# Patient Record
Sex: FEMALE | Race: OTHER | HISPANIC OR LATINO
[De-identification: names, ages, dates, MRNs, and addresses within clinical notes are randomized per-mention and may not be internally consistent; named-entity substitution may affect disease eponyms.]

---

## 2021-02-24 PROBLEM — Z00.00 ENCOUNTER FOR PREVENTIVE HEALTH EXAMINATION: Status: ACTIVE | Noted: 2021-02-24

## 2021-03-16 ENCOUNTER — APPOINTMENT (OUTPATIENT)
Dept: BARIATRICS | Facility: CLINIC | Age: 41
End: 2021-03-16
Payer: COMMERCIAL

## 2021-03-16 VITALS — BODY MASS INDEX: 40.12 KG/M2 | HEIGHT: 62 IN | WEIGHT: 218 LBS

## 2021-03-16 PROCEDURE — 99204 OFFICE O/P NEW MOD 45 MIN: CPT | Mod: 95

## 2021-03-16 NOTE — ASSESSMENT
[FreeTextEntry1] : 40 year old female presenting for morbid obesity with a BMI of 40, interested in going forward with sleeve gastrectomy. Will have patient work with our dietician, see psychologist, ordered blood work and UGI series.

## 2021-03-16 NOTE — HISTORY OF PRESENT ILLNESS
[de-identified] : Ms. Amaya presents today for a chief complaint of morbid obesity. 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 She complains of musculoskeletal pain due to her weight. Denies any medical history besides gestational hypertension and diabetes, hx of two c sections and is on no medications. She has tried multiple dietary regimens and medical weight loss with medications with no success. Denies GERD. Will have her work with our dietician, order blood work, UGI and start certification process.  [Home] : at home, [unfilled] , at the time of the visit. [Medical Office: ([facility])___] : at the medical office located in  [Verbal consent obtained from patient] : the patient, [unfilled]

## 2021-03-16 NOTE — HISTORY OF PRESENT ILLNESS
[de-identified] : Ms. Amaya presents today for a chief complaint of morbid obesity. 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 She complains of musculoskeletal pain due to her weight. Denies any medical history besides gestational hypertension and diabetes, hx of two c sections and is on no medications. She has tried multiple dietary regimens and medical weight loss with medications with no success. Denies GERD. Will have her work with our dietician, order blood work, UGI and start certification process.  [Home] : at home, [unfilled] , at the time of the visit. [Medical Office: ([facility])___] : at the medical office located in  [Verbal consent obtained from patient] : the patient, [unfilled]

## 2021-04-01 ENCOUNTER — TRANSCRIPTION ENCOUNTER (OUTPATIENT)
Age: 41
End: 2021-04-01

## 2021-04-05 ENCOUNTER — APPOINTMENT (OUTPATIENT)
Dept: BARIATRICS | Facility: CLINIC | Age: 41
End: 2021-04-05

## 2021-04-06 ENCOUNTER — APPOINTMENT (OUTPATIENT)
Dept: BARIATRICS | Facility: CLINIC | Age: 41
End: 2021-04-06
Payer: SELF-PAY

## 2021-04-06 PROCEDURE — 98968 PH1 ASSMT&MGMT NQHP 21-30: CPT

## 2021-08-04 ENCOUNTER — APPOINTMENT (OUTPATIENT)
Dept: BARIATRICS | Facility: CLINIC | Age: 41
End: 2021-08-04
Payer: COMMERCIAL

## 2021-08-04 VITALS — BODY MASS INDEX: 41.41 KG/M2 | HEIGHT: 62 IN | WEIGHT: 225 LBS

## 2021-08-04 PROCEDURE — 99442: CPT

## 2021-08-04 NOTE — ASSESSMENT
[FreeTextEntry1] : 40 year old female with history of morbid obesity presenting for a final visit for a lap sleeve gastrectomy scheduled on 8/16/21. All questions answered and preop instructions given.

## 2021-08-04 NOTE — HISTORY OF PRESENT ILLNESS
[Home] : at home, [unfilled] , at the time of the visit. [Medical Office: ([facility])___] : at the medical office located in  [Verbal consent obtained from patient] : the patient, [unfilled] [de-identified] : Ms. Amaya presents today for a final visit for a lap sleeve gastrectomy scheduled on 8/16/21. UGI and EGD reviewed. Alternatives, risks and benefits discussed. All questions answered and preop instructions given. BSTOP, use and rationale of non-opioid medications for pain management explained, patient verbalized understanding.\par

## 2021-08-10 ENCOUNTER — NON-APPOINTMENT (OUTPATIENT)
Age: 41
End: 2021-08-10

## 2021-08-13 ENCOUNTER — LABORATORY RESULT (OUTPATIENT)
Age: 41
End: 2021-08-13

## 2021-08-13 VITALS
HEIGHT: 61 IN | RESPIRATION RATE: 16 BRPM | WEIGHT: 223.11 LBS | DIASTOLIC BLOOD PRESSURE: 85 MMHG | TEMPERATURE: 98 F | SYSTOLIC BLOOD PRESSURE: 129 MMHG | HEART RATE: 71 BPM | OXYGEN SATURATION: 99 %

## 2021-08-13 NOTE — PRE-OP CHECKLIST - SELECT TESTS ORDERED
CBC/CMP/PT/PTT/INR/Urinalysis/EKG/CXR/Results in MD note CBC/CMP/PT/PTT/INR/Urinalysis/EKG/CXR/Results in MD note/COVID-19

## 2021-08-15 ENCOUNTER — TRANSCRIPTION ENCOUNTER (OUTPATIENT)
Age: 41
End: 2021-08-15

## 2021-08-16 ENCOUNTER — INPATIENT (INPATIENT)
Facility: HOSPITAL | Age: 41
LOS: 2 days | Discharge: HOME | DRG: 621 | End: 2021-08-19
Attending: SURGERY | Admitting: SURGERY
Payer: COMMERCIAL

## 2021-08-16 ENCOUNTER — RESULT REVIEW (OUTPATIENT)
Age: 41
End: 2021-08-16

## 2021-08-16 ENCOUNTER — APPOINTMENT (OUTPATIENT)
Dept: BARIATRICS | Facility: HOSPITAL | Age: 41
End: 2021-08-16

## 2021-08-16 DIAGNOSIS — Z98.891 HISTORY OF UTERINE SCAR FROM PREVIOUS SURGERY: Chronic | ICD-10-CM

## 2021-08-16 DIAGNOSIS — Z98.890 OTHER SPECIFIED POSTPROCEDURAL STATES: Chronic | ICD-10-CM

## 2021-08-16 LAB
HCT VFR BLD CALC: 31.3 % — LOW (ref 34.5–45)
HCT VFR BLD CALC: 32.3 % — LOW (ref 34.5–45)
HGB BLD-MCNC: 9.5 G/DL — LOW (ref 11.5–15.5)
HGB BLD-MCNC: 9.9 G/DL — LOW (ref 11.5–15.5)
MCHC RBC-ENTMCNC: 29.7 PG — SIGNIFICANT CHANGE UP (ref 27–34)
MCHC RBC-ENTMCNC: 29.9 PG — SIGNIFICANT CHANGE UP (ref 27–34)
MCHC RBC-ENTMCNC: 30.4 GM/DL — LOW (ref 32–36)
MCHC RBC-ENTMCNC: 30.7 GM/DL — LOW (ref 32–36)
MCV RBC AUTO: 97 FL — SIGNIFICANT CHANGE UP (ref 80–100)
MCV RBC AUTO: 98.4 FL — SIGNIFICANT CHANGE UP (ref 80–100)
NRBC # BLD: 0 /100 WBCS — SIGNIFICANT CHANGE UP (ref 0–0)
NRBC # BLD: 0 /100 WBCS — SIGNIFICANT CHANGE UP (ref 0–0)
PLATELET # BLD AUTO: 203 K/UL — SIGNIFICANT CHANGE UP (ref 150–400)
PLATELET # BLD AUTO: 220 K/UL — SIGNIFICANT CHANGE UP (ref 150–400)
RBC # BLD: 3.18 M/UL — LOW (ref 3.8–5.2)
RBC # BLD: 3.33 M/UL — LOW (ref 3.8–5.2)
RBC # FLD: 13.3 % — SIGNIFICANT CHANGE UP (ref 10.3–14.5)
RBC # FLD: 13.3 % — SIGNIFICANT CHANGE UP (ref 10.3–14.5)
WBC # BLD: 16.68 K/UL — HIGH (ref 3.8–10.5)
WBC # BLD: 18.43 K/UL — HIGH (ref 3.8–10.5)
WBC # FLD AUTO: 16.68 K/UL — HIGH (ref 3.8–10.5)
WBC # FLD AUTO: 18.43 K/UL — HIGH (ref 3.8–10.5)

## 2021-08-16 PROCEDURE — 43775 LAP SLEEVE GASTRECTOMY: CPT

## 2021-08-16 PROCEDURE — 39540 REPAIR OF DIAPHRAGM HERNIA: CPT

## 2021-08-16 PROCEDURE — 88307 TISSUE EXAM BY PATHOLOGIST: CPT | Mod: 26

## 2021-08-16 RX ORDER — HYDROMORPHONE HYDROCHLORIDE 2 MG/ML
0.25 INJECTION INTRAMUSCULAR; INTRAVENOUS; SUBCUTANEOUS
Refills: 0 | Status: DISCONTINUED | OUTPATIENT
Start: 2021-08-16 | End: 2021-08-16

## 2021-08-16 RX ORDER — ACETAMINOPHEN 500 MG
1000 TABLET ORAL ONCE
Refills: 0 | Status: COMPLETED | OUTPATIENT
Start: 2021-08-17 | End: 2021-08-17

## 2021-08-16 RX ORDER — ACETAMINOPHEN 500 MG
650 TABLET ORAL EVERY 6 HOURS
Refills: 0 | Status: DISCONTINUED | OUTPATIENT
Start: 2021-08-16 | End: 2021-08-19

## 2021-08-16 RX ORDER — KETOROLAC TROMETHAMINE 30 MG/ML
15 SYRINGE (ML) INJECTION EVERY 6 HOURS
Refills: 0 | Status: DISCONTINUED | OUTPATIENT
Start: 2021-08-16 | End: 2021-08-16

## 2021-08-16 RX ORDER — SODIUM CHLORIDE 9 MG/ML
500 INJECTION, SOLUTION INTRAVENOUS ONCE
Refills: 0 | Status: COMPLETED | OUTPATIENT
Start: 2021-08-16 | End: 2021-08-16

## 2021-08-16 RX ORDER — HYDROMORPHONE HYDROCHLORIDE 2 MG/ML
0.25 INJECTION INTRAMUSCULAR; INTRAVENOUS; SUBCUTANEOUS ONCE
Refills: 0 | Status: DISCONTINUED | OUTPATIENT
Start: 2021-08-16 | End: 2021-08-16

## 2021-08-16 RX ORDER — ACETAMINOPHEN 500 MG
1000 TABLET ORAL ONCE
Refills: 0 | Status: COMPLETED | OUTPATIENT
Start: 2021-08-16 | End: 2021-08-16

## 2021-08-16 RX ORDER — SODIUM CHLORIDE 9 MG/ML
1000 INJECTION, SOLUTION INTRAVENOUS
Refills: 0 | Status: DISCONTINUED | OUTPATIENT
Start: 2021-08-16 | End: 2021-08-18

## 2021-08-16 RX ORDER — ENOXAPARIN SODIUM 100 MG/ML
30 INJECTION SUBCUTANEOUS ONCE
Refills: 0 | Status: COMPLETED | OUTPATIENT
Start: 2021-08-16 | End: 2021-08-16

## 2021-08-16 RX ORDER — SODIUM CHLORIDE 9 MG/ML
1000 INJECTION, SOLUTION INTRAVENOUS ONCE
Refills: 0 | Status: COMPLETED | OUTPATIENT
Start: 2021-08-16 | End: 2021-08-16

## 2021-08-16 RX ORDER — ONDANSETRON 8 MG/1
4 TABLET, FILM COATED ORAL EVERY 6 HOURS
Refills: 0 | Status: DISCONTINUED | OUTPATIENT
Start: 2021-08-16 | End: 2021-08-19

## 2021-08-16 RX ORDER — GABAPENTIN 400 MG/1
300 CAPSULE ORAL ONCE
Refills: 0 | Status: COMPLETED | OUTPATIENT
Start: 2021-08-16 | End: 2021-08-16

## 2021-08-16 RX ORDER — SCOPALAMINE 1 MG/3D
1 PATCH, EXTENDED RELEASE TRANSDERMAL ONCE
Refills: 0 | Status: COMPLETED | OUTPATIENT
Start: 2021-08-16 | End: 2021-08-16

## 2021-08-16 RX ORDER — PANTOPRAZOLE SODIUM 20 MG/1
40 TABLET, DELAYED RELEASE ORAL DAILY
Refills: 0 | Status: DISCONTINUED | OUTPATIENT
Start: 2021-08-16 | End: 2021-08-19

## 2021-08-16 RX ADMIN — Medication 1000 MILLIGRAM(S): at 22:10

## 2021-08-16 RX ADMIN — SODIUM CHLORIDE 1000 MILLILITER(S): 9 INJECTION, SOLUTION INTRAVENOUS at 17:30

## 2021-08-16 RX ADMIN — HYDROMORPHONE HYDROCHLORIDE 0.25 MILLIGRAM(S): 2 INJECTION INTRAMUSCULAR; INTRAVENOUS; SUBCUTANEOUS at 14:20

## 2021-08-16 RX ADMIN — SODIUM CHLORIDE 140 MILLILITER(S): 9 INJECTION, SOLUTION INTRAVENOUS at 15:09

## 2021-08-16 RX ADMIN — GABAPENTIN 300 MILLIGRAM(S): 400 CAPSULE ORAL at 08:59

## 2021-08-16 RX ADMIN — SODIUM CHLORIDE 1000 MILLILITER(S): 9 INJECTION, SOLUTION INTRAVENOUS at 15:49

## 2021-08-16 RX ADMIN — SCOPALAMINE 1 PATCH: 1 PATCH, EXTENDED RELEASE TRANSDERMAL at 18:41

## 2021-08-16 RX ADMIN — SODIUM CHLORIDE 1000 MILLILITER(S): 9 INJECTION, SOLUTION INTRAVENOUS at 16:43

## 2021-08-16 RX ADMIN — HYDROMORPHONE HYDROCHLORIDE 0.25 MILLIGRAM(S): 2 INJECTION INTRAMUSCULAR; INTRAVENOUS; SUBCUTANEOUS at 14:33

## 2021-08-16 RX ADMIN — Medication 1000 MILLIGRAM(S): at 08:58

## 2021-08-16 RX ADMIN — Medication 1000 MILLIGRAM(S): at 14:03

## 2021-08-16 RX ADMIN — Medication 400 MILLIGRAM(S): at 21:42

## 2021-08-16 RX ADMIN — ENOXAPARIN SODIUM 30 MILLIGRAM(S): 100 INJECTION SUBCUTANEOUS at 09:01

## 2021-08-16 RX ADMIN — Medication 400 MILLIGRAM(S): at 13:44

## 2021-08-16 RX ADMIN — SCOPALAMINE 1 PATCH: 1 PATCH, EXTENDED RELEASE TRANSDERMAL at 08:59

## 2021-08-16 NOTE — H&P ADULT - HISTORY OF PRESENT ILLNESS
40F PMHx ovarian cystectomy,  x 2, MO (BMI 42.2) resistant to dietary and lifestyle interventions presents for elective laparoscopic sleeve gastrectomy. Preop EGD revealed chronic active gastritis, esophagitis, and small hiatal hernia. Pt has no acute complaints today.

## 2021-08-16 NOTE — H&P ADULT - ASSESSMENT
40F PMHx ovarian cystectomy,  x 2, MO (BMI 42.2) resistant to dietary and lifestyle interventions presents for elective laparoscopic sleeve gastrectomy. Preop EGD revealed chronic active gastritis, esophagitis, and small hiatal hernia. Pt has no acute complaints today.   - Proceed to OR

## 2021-08-16 NOTE — BRIEF OPERATIVE NOTE - OPERATION/FINDINGS
a Bilateral crura dissected. Lesser sac entered. Short gastrics divided with Vessel sealer. 36F Bougie edge using 1 black load near and away from incisura angularis followed by purple loads moving along the greater curvature. Hiatal hernia repaired primarily with quill suture. The abdomen was inspected. Hemostasis achieved. Stomach remnant was removed. Fascia was closed with endoclose suture. Skin closed with 4-0 monocryl.

## 2021-08-16 NOTE — H&P ADULT - NSHPPHYSICALEXAM_GEN_ALL_CORE
T(C): --  HR: --  BP: --  RR: --  SpO2: --    GENERAL: NAD, Resting comfortably in bed, awake, opens eyes spontaneously  HEENT: NCAT, MMM, Normal conjunctiva, PERRL  RESP: Nonlabored breathing, No respiratory distress  CARD: Normal rate, Normal peripheral perfusion  GI: Soft, ND, NT, No guarding, No rebound tenderness  EXTREM: WWP, No edema, No gross deformity of extremities  SKIN: No rashes, no lesions  NEURO: AAOx3, No focal motor or sensory deficits  PSYCH: Affect and characteristics of appearance, verbalizations, and behaviors are appropriate GENERAL: NAD, Resting comfortably in bed, awake, opens eyes spontaneously  HEENT: NCAT, MMM, Normal conjunctiva, PERRL  RESP: Nonlabored breathing, No respiratory distress  CARD: Normal rate, Normal peripheral perfusion  GI: Soft, ND, NT, No guarding, No rebound tenderness  EXTREM: WWP, No edema, No gross deformity of extremities  SKIN: No rashes, no lesions  NEURO: AAOx3, No focal motor or sensory deficits  PSYCH: Affect and characteristics of appearance, verbalizations, and behaviors are appropriate

## 2021-08-16 NOTE — BRIEF OPERATIVE NOTE - NSICDXBRIEFPROCEDURE_GEN_ALL_CORE_FT
PROCEDURES:  Laparoscopic sleeve gastrectomy 16-Aug-2021 12:55:07  Paramjit Colbert  Laparoscopic repair of sliding hiatal hernia 16-Aug-2021 12:55:40  Paramjit Colbert

## 2021-08-16 NOTE — CHART NOTE - NSCHARTNOTEFT_GEN_A_CORE
Team 2 was called by patient's nurse that patient's sbp was in the 80s.  POD 0, accessed patient bedside and she was resting comfortably however diaphoretic.  Patient states she had some abdominal discomfort in the epigastric region and she feels extremely weak.  Pain level 7/10. New vitals were obtained with a bariatric cuff 92/63 with a hr 76.  Patient sating above 93% on ra.  Abdominal exam: obese, soft, appropriately tender in the epigastric region and around incision sites, non distended, no rebound or guarding.  Patient currently denies fever/chills, chest pain, shortness of breath, nausea, and vomiting. Ramirez was inserted for strict I/Os and the chief was notified.      ICU Vital Signs Last 24 Hrs  T(C): 36.9 (16 Aug 2021 20:25), Max: 36.9 (16 Aug 2021 20:25)  T(F): 98.4 (16 Aug 2021 20:25), Max: 98.4 (16 Aug 2021 20:25)  HR: 66 (16 Aug 2021 20:25) (60 - 88)  BP: 91/59 (16 Aug 2021 20:25) (80/51 - 190/82)  BP(mean): 98 (16 Aug 2021 18:33) (60 - 124)  RR: 20 (16 Aug 2021 20:25) (12 - 24)  SpO2: 99% (16 Aug 2021 20:25) (95% - 100%)    PHYSICAL EXAMINATION   General: NAD  NEURO: AAOx3, follows commands  CV: pulses present and strong b/l in UE  PULM: nonlabored breathing, no respiratory distress  ABD: obese, soft, appropriately tender in the epigastric region and around incisional sites, non distended with no rebound or guarding  Extem: WWP, SCDs in place    40F PMHx ovarian cystectomy,  x 2, MO (BMI 42.2) resistant to dietary and lifestyle interventions now s/p elective laparoscopic sleeve gastrectomy performed on .     Pain nausea control  BCLD/IVF LR @ 140cc/hr  Protonix  CBC 6 hr post op  Ramirez inserted for strict I/Os  SCDs, OOBA, IS,  AM labs  Nutritional consult in AM
Called by RN to report hypotension.  Patient sleeping in bed, NAD.  LR bolus ordered, requested RN page and inform team - per RN team aware and states they will evaluate patient.  After bolus BP improved to 90s but when bolus complete SBP in 80s.    Abdomen soft, mildly tender to palpation.  2nd bolus ordered, Dr. Shelley notified, CBC ordered.  Team paged by RN and the writer, awaiting response.

## 2021-08-17 LAB
ANION GAP SERPL CALC-SCNC: 10 MMOL/L — SIGNIFICANT CHANGE UP (ref 5–17)
BLD GP AB SCN SERPL QL: NEGATIVE — SIGNIFICANT CHANGE UP
BUN SERPL-MCNC: 10 MG/DL — SIGNIFICANT CHANGE UP (ref 7–23)
CALCIUM SERPL-MCNC: 8.5 MG/DL — SIGNIFICANT CHANGE UP (ref 8.4–10.5)
CHLORIDE SERPL-SCNC: 102 MMOL/L — SIGNIFICANT CHANGE UP (ref 96–108)
CO2 SERPL-SCNC: 27 MMOL/L — SIGNIFICANT CHANGE UP (ref 22–31)
COVID-19 SPIKE DOMAIN AB INTERP: POSITIVE
COVID-19 SPIKE DOMAIN ANTIBODY RESULT: >250 U/ML — HIGH
CREAT SERPL-MCNC: 0.49 MG/DL — LOW (ref 0.5–1.3)
GLUCOSE SERPL-MCNC: 126 MG/DL — HIGH (ref 70–99)
HCT VFR BLD CALC: 24.3 % — LOW (ref 34.5–45)
HCT VFR BLD CALC: 27.5 % — LOW (ref 34.5–45)
HGB BLD-MCNC: 7.6 G/DL — LOW (ref 11.5–15.5)
HGB BLD-MCNC: 8.7 G/DL — LOW (ref 11.5–15.5)
MAGNESIUM SERPL-MCNC: 1.8 MG/DL — SIGNIFICANT CHANGE UP (ref 1.6–2.6)
MCHC RBC-ENTMCNC: 29.5 PG — SIGNIFICANT CHANGE UP (ref 27–34)
MCHC RBC-ENTMCNC: 29.7 PG — SIGNIFICANT CHANGE UP (ref 27–34)
MCHC RBC-ENTMCNC: 31.3 GM/DL — LOW (ref 32–36)
MCHC RBC-ENTMCNC: 31.6 GM/DL — LOW (ref 32–36)
MCV RBC AUTO: 93.9 FL — SIGNIFICANT CHANGE UP (ref 80–100)
MCV RBC AUTO: 94.2 FL — SIGNIFICANT CHANGE UP (ref 80–100)
NRBC # BLD: 0 /100 WBCS — SIGNIFICANT CHANGE UP (ref 0–0)
NRBC # BLD: 0 /100 WBCS — SIGNIFICANT CHANGE UP (ref 0–0)
PHOSPHATE SERPL-MCNC: 3.8 MG/DL — SIGNIFICANT CHANGE UP (ref 2.5–4.5)
PLATELET # BLD AUTO: 201 K/UL — SIGNIFICANT CHANGE UP (ref 150–400)
PLATELET # BLD AUTO: 202 K/UL — SIGNIFICANT CHANGE UP (ref 150–400)
POTASSIUM SERPL-MCNC: 3.7 MMOL/L — SIGNIFICANT CHANGE UP (ref 3.5–5.3)
POTASSIUM SERPL-SCNC: 3.7 MMOL/L — SIGNIFICANT CHANGE UP (ref 3.5–5.3)
RBC # BLD: 2.58 M/UL — LOW (ref 3.8–5.2)
RBC # BLD: 2.93 M/UL — LOW (ref 3.8–5.2)
RBC # FLD: 13.2 % — SIGNIFICANT CHANGE UP (ref 10.3–14.5)
RBC # FLD: 13.5 % — SIGNIFICANT CHANGE UP (ref 10.3–14.5)
RH IG SCN BLD-IMP: POSITIVE — SIGNIFICANT CHANGE UP
SARS-COV-2 IGG+IGM SERPL QL IA: >250 U/ML — HIGH
SARS-COV-2 IGG+IGM SERPL QL IA: POSITIVE
SODIUM SERPL-SCNC: 139 MMOL/L — SIGNIFICANT CHANGE UP (ref 135–145)
WBC # BLD: 12.66 K/UL — HIGH (ref 3.8–10.5)
WBC # BLD: 13.78 K/UL — HIGH (ref 3.8–10.5)
WBC # FLD AUTO: 12.66 K/UL — HIGH (ref 3.8–10.5)
WBC # FLD AUTO: 13.78 K/UL — HIGH (ref 3.8–10.5)

## 2021-08-17 RX ORDER — POTASSIUM CHLORIDE 20 MEQ
20 PACKET (EA) ORAL ONCE
Refills: 0 | Status: COMPLETED | OUTPATIENT
Start: 2021-08-17 | End: 2021-08-17

## 2021-08-17 RX ORDER — ACETAMINOPHEN 500 MG
1000 TABLET ORAL ONCE
Refills: 0 | Status: COMPLETED | OUTPATIENT
Start: 2021-08-17 | End: 2021-08-17

## 2021-08-17 RX ORDER — ACETAMINOPHEN 500 MG
1000 TABLET ORAL ONCE
Refills: 0 | Status: COMPLETED | OUTPATIENT
Start: 2021-08-17 | End: 2021-08-18

## 2021-08-17 RX ADMIN — SODIUM CHLORIDE 140 MILLILITER(S): 9 INJECTION, SOLUTION INTRAVENOUS at 18:08

## 2021-08-17 RX ADMIN — Medication 400 MILLIGRAM(S): at 10:28

## 2021-08-17 RX ADMIN — Medication 1000 MILLIGRAM(S): at 11:00

## 2021-08-17 RX ADMIN — SODIUM CHLORIDE 140 MILLILITER(S): 9 INJECTION, SOLUTION INTRAVENOUS at 10:28

## 2021-08-17 RX ADMIN — SCOPALAMINE 1 PATCH: 1 PATCH, EXTENDED RELEASE TRANSDERMAL at 19:57

## 2021-08-17 RX ADMIN — Medication 650 MILLIGRAM(S): at 17:52

## 2021-08-17 RX ADMIN — SCOPALAMINE 1 PATCH: 1 PATCH, EXTENDED RELEASE TRANSDERMAL at 07:00

## 2021-08-17 RX ADMIN — Medication 400 MILLIGRAM(S): at 03:40

## 2021-08-17 RX ADMIN — Medication 50 MILLIEQUIVALENT(S): at 13:36

## 2021-08-17 RX ADMIN — Medication 1000 MILLIGRAM(S): at 04:05

## 2021-08-17 RX ADMIN — Medication 650 MILLIGRAM(S): at 18:25

## 2021-08-17 RX ADMIN — SODIUM CHLORIDE 140 MILLILITER(S): 9 INJECTION, SOLUTION INTRAVENOUS at 03:40

## 2021-08-17 RX ADMIN — PANTOPRAZOLE SODIUM 40 MILLIGRAM(S): 20 TABLET, DELAYED RELEASE ORAL at 12:37

## 2021-08-17 NOTE — DIETITIAN INITIAL EVALUATION ADULT. - ADD RECOMMEND
POD 2 upgrade to FLD, drink 2-3 protein shakes/day. Goal 64oz fluid/day. Take daily vitamin/minerals

## 2021-08-17 NOTE — PHYSICAL THERAPY INITIAL EVALUATION ADULT - GENERAL OBSERVATIONS, REHAB EVAL
Received supine complaints of abdominal pain 6/10 +EKG, IV, B SCD. left as found +lines intact, RN aaron aware, call galan

## 2021-08-17 NOTE — DIETITIAN INITIAL EVALUATION ADULT. - OTHER CALCULATIONS
IBW for needs d/t % IBW >120. Current needs x 2 weeks then transition to maintenance needs of 952-1190 (20-25kcal/kg of IBW)

## 2021-08-17 NOTE — DIETITIAN INITIAL EVALUATION ADULT. - OTHER INFO
Pt admitted for elective lap sleeve gastrectomy on 8/16, currently POD 1. On bariatric CLD. Spoke with pt in room, just only taken a few sips of broth so far this morning. Reports gas pain and mild nausea. Ambulated in jansen yesterday, but not yet this morning. Discussed goal to consume four 1oz cups every hour (1oz every 15 minutes).     Skin: lap sites noted, no breakdown   GI: abd-soft/ obese    Reviewed bariatric diet. Discussed CLD and progression to FLD on POD 2 with start of protein shakes. Goal of 60-80 grams protein per day and 64oz fluid  (primarily water) per day. Discussed drinking 4oz every hour. Also discussed vitamin/mineral supplements required. Informed pt will follow with outpatient dietitian to assist with diet progression. Provided gastric sleeve diet handout in [place]. Answered pt's questions and verbalized understanding.

## 2021-08-18 ENCOUNTER — TRANSCRIPTION ENCOUNTER (OUTPATIENT)
Age: 41
End: 2021-08-18

## 2021-08-18 LAB
ANION GAP SERPL CALC-SCNC: 7 MMOL/L — SIGNIFICANT CHANGE UP (ref 5–17)
BUN SERPL-MCNC: 7 MG/DL — SIGNIFICANT CHANGE UP (ref 7–23)
CALCIUM SERPL-MCNC: 8.7 MG/DL — SIGNIFICANT CHANGE UP (ref 8.4–10.5)
CHLORIDE SERPL-SCNC: 105 MMOL/L — SIGNIFICANT CHANGE UP (ref 96–108)
CO2 SERPL-SCNC: 28 MMOL/L — SIGNIFICANT CHANGE UP (ref 22–31)
CREAT SERPL-MCNC: 0.52 MG/DL — SIGNIFICANT CHANGE UP (ref 0.5–1.3)
GLUCOSE SERPL-MCNC: 110 MG/DL — HIGH (ref 70–99)
HCT VFR BLD CALC: 20.7 % — CRITICAL LOW (ref 34.5–45)
HCT VFR BLD CALC: 20.8 % — CRITICAL LOW (ref 34.5–45)
HGB BLD-MCNC: 6.5 G/DL — CRITICAL LOW (ref 11.5–15.5)
HGB BLD-MCNC: 6.6 G/DL — CRITICAL LOW (ref 11.5–15.5)
MAGNESIUM SERPL-MCNC: 1.9 MG/DL — SIGNIFICANT CHANGE UP (ref 1.6–2.6)
MCHC RBC-ENTMCNC: 29.8 PG — SIGNIFICANT CHANGE UP (ref 27–34)
MCHC RBC-ENTMCNC: 29.9 PG — SIGNIFICANT CHANGE UP (ref 27–34)
MCHC RBC-ENTMCNC: 31.4 GM/DL — LOW (ref 32–36)
MCHC RBC-ENTMCNC: 31.7 GM/DL — LOW (ref 32–36)
MCV RBC AUTO: 94.1 FL — SIGNIFICANT CHANGE UP (ref 80–100)
MCV RBC AUTO: 95 FL — SIGNIFICANT CHANGE UP (ref 80–100)
NRBC # BLD: 0 /100 WBCS — SIGNIFICANT CHANGE UP (ref 0–0)
NRBC # BLD: 0 /100 WBCS — SIGNIFICANT CHANGE UP (ref 0–0)
PHOSPHATE SERPL-MCNC: 2.7 MG/DL — SIGNIFICANT CHANGE UP (ref 2.5–4.5)
PLATELET # BLD AUTO: 176 K/UL — SIGNIFICANT CHANGE UP (ref 150–400)
PLATELET # BLD AUTO: 177 K/UL — SIGNIFICANT CHANGE UP (ref 150–400)
POTASSIUM SERPL-MCNC: 3.9 MMOL/L — SIGNIFICANT CHANGE UP (ref 3.5–5.3)
POTASSIUM SERPL-SCNC: 3.9 MMOL/L — SIGNIFICANT CHANGE UP (ref 3.5–5.3)
RBC # BLD: 2.18 M/UL — LOW (ref 3.8–5.2)
RBC # BLD: 2.21 M/UL — LOW (ref 3.8–5.2)
RBC # FLD: 13.9 % — SIGNIFICANT CHANGE UP (ref 10.3–14.5)
RBC # FLD: 13.9 % — SIGNIFICANT CHANGE UP (ref 10.3–14.5)
SODIUM SERPL-SCNC: 140 MMOL/L — SIGNIFICANT CHANGE UP (ref 135–145)
WBC # BLD: 11.27 K/UL — HIGH (ref 3.8–10.5)
WBC # BLD: 11.5 K/UL — HIGH (ref 3.8–10.5)
WBC # FLD AUTO: 11.27 K/UL — HIGH (ref 3.8–10.5)
WBC # FLD AUTO: 11.5 K/UL — HIGH (ref 3.8–10.5)

## 2021-08-18 RX ORDER — ACETAMINOPHEN 500 MG
2 TABLET ORAL
Qty: 40 | Refills: 0
Start: 2021-08-18 | End: 2021-08-22

## 2021-08-18 RX ORDER — OMEPRAZOLE 10 MG/1
0 CAPSULE, DELAYED RELEASE ORAL
Qty: 0 | Refills: 0 | DISCHARGE

## 2021-08-18 RX ORDER — OMEPRAZOLE 10 MG/1
1 CAPSULE, DELAYED RELEASE ORAL
Qty: 30 | Refills: 0
Start: 2021-08-18 | End: 2021-09-16

## 2021-08-18 RX ORDER — APIXABAN 2.5 MG/1
1 TABLET, FILM COATED ORAL
Qty: 60 | Refills: 0
Start: 2021-08-18 | End: 2021-09-16

## 2021-08-18 RX ADMIN — SODIUM CHLORIDE 75 MILLILITER(S): 9 INJECTION, SOLUTION INTRAVENOUS at 06:21

## 2021-08-18 RX ADMIN — PANTOPRAZOLE SODIUM 40 MILLIGRAM(S): 20 TABLET, DELAYED RELEASE ORAL at 09:28

## 2021-08-18 RX ADMIN — Medication 1000 MILLIGRAM(S): at 17:00

## 2021-08-18 RX ADMIN — SCOPALAMINE 1 PATCH: 1 PATCH, EXTENDED RELEASE TRANSDERMAL at 06:20

## 2021-08-18 RX ADMIN — Medication 400 MILLIGRAM(S): at 16:24

## 2021-08-18 NOTE — DISCHARGE NOTE PROVIDER - NSDCMRMEDTOKEN_GEN_ALL_CORE_FT
Eliquis 2.5 mg oral tablet: 1 tab(s) orally 2 times a day MDD:2 tabs  omeprazole 40 mg oral delayed release capsule: 1 cap(s) orally once a day MDD:1 cap  Tylenol 325 mg oral tablet: 2 tab(s) orally every 6 hours MDD:6 tabs

## 2021-08-18 NOTE — DISCHARGE NOTE PROVIDER - CARE PROVIDER_API CALL
Michel Maciel (MD)  Surgery  [address] [place] [address], [place] [address]  Phone: [phone]  Fax: [phone]  Follow Up Time: 1 week

## 2021-08-18 NOTE — DISCHARGE NOTE PROVIDER - HOSPITAL COURSE
40F PMHx ovarian cystectomy,  x 2, MO (BMI 42.2) resistant to dietary and lifestyle interventions now s/p laparoscopic sleeve gastrectomy  post-op course notable for asymptomatic anemia with normal orthostatics and appropriate urine output.  Patient tolerated regular diet, pain well controlled and ambulating at baseline.    Follow up with Dr. Maciel in 1 week. Call the office at  to schedule your appointment.   You may shower; soap and water over incision sites. Do not scrub. Pat dry when done. No tub bathing or swimming until cleared. Keep incision sites out of the sun as scars will darken.   No heavy lifting (>10lbs) or strenuous exercise.   Diet: Bariatric Full Fluids. 60 grams protein daily.  64 fluid ounces water daily. Drink small sips throughout the day. Continue diet as outlined by paperwork received as a pre-operative patient.   You should be urinating at least 3-4x per day.   Call the office if you experience increasing abdominal pain, nausea, vomiting, or temperature >100.4F.    NO ASPIRIN OR NSAIDs until approved by Dr. Maciel. Avoid alcoholic beverages until cleared by Dr. Maciel.   40F PMHx ovarian cystectomy,  x 2, MO (BMI 42.2) resistant to dietary and lifestyle interventions now s/p laparoscopic sleeve gastrectomy  post-op course notable for asymptomatic anemia with normal orthostatics and appropriate urine output.  Patient tolerated regular diet, pain well controlled and ambulating at baseline. Hemoglobin, vital signs, and patient condition remained stable on day of discharge and patient was cleared to go home after discussion with attending.    Follow up with Dr. Maciel in 1 week. Call the office at  to schedule your appointment.   You may shower; soap and water over incision sites. Do not scrub. Pat dry when done. No tub bathing or swimming until cleared. Keep incision sites out of the sun as scars will darken.   No heavy lifting (>10lbs) or strenuous exercise.   Diet: Bariatric Full Fluids. 60 grams protein daily.  64 fluid ounces water daily. Drink small sips throughout the day. Continue diet as outlined by paperwork received as a pre-operative patient.   You should be urinating at least 3-4x per day.   Call the office if you experience increasing abdominal pain, nausea, vomiting, or temperature >100.4F.    NO ASPIRIN OR NSAIDs until approved by Dr. Maciel. Avoid alcoholic beverages until cleared by Dr. Maciel.   40F PMHx ovarian cystectomy,  x 2, MO (BMI 42.2) resistant to dietary and lifestyle interventions now s/p laparoscopic sleeve gastrectomy  post-op course notable for asymptomatic anemia with normal orthostatics and appropriate urine output.  Patient tolerated regular diet, pain well controlled and ambulating at baseline. Hemoglobin, vital signs, and patient condition remained stable on day of discharge and patient was cleared to go home after discussion with attending.    Follow up with Dr. Maciel in 1 week. Call the office at  to schedule your appointment.   You may shower; soap and water over incision sites. Do not scrub. Pat dry when done. No tub bathing or swimming until cleared. Keep incision sites out of the sun as scars will darken.   No heavy lifting (>10lbs) or strenuous exercise.   Diet: Bariatric Full Fluids. 60 grams protein daily.  64 fluid ounces water daily. Drink small sips throughout the day. Continue diet as outlined by paperwork received as a pre-operative patient.   You should be urinating at least 3-4x per day.   Call the office if you experience increasing abdominal pain, nausea, vomiting, or temperature >100.4F.    NO ASPIRIN until approved by Dr. Maciel. Avoid alcoholic beverages until cleared by Dr. Maciel.

## 2021-08-18 NOTE — DISCHARGE NOTE PROVIDER - NSDCFUADDINST_GEN_ALL_CORE_FT
Follow up with Dr. Maciel in 1 week. Call the office at  to schedule your appointment.   You may shower; soap and water over incision sites. Do not scrub. Pat dry when done. No tub bathing or swimming until cleared. Keep incision sites out of the sun as scars will darken.   No heavy lifting (>10lbs) or strenuous exercise.   Diet: Bariatric Full Fluids. 60 grams protein daily.  64 fluid ounces water daily. Drink small sips throughout the day. Continue diet as outlined by paperwork received as a pre-operative patient.   You should be urinating at least 3-4x per day.   Call the office if you experience increasing abdominal pain, nausea, vomiting, or temperature >100.4F.    NO ASPIRIN OR NSAIDs until approved by Dr. Maciel. Avoid alcoholic beverages until cleared by Dr. Maciel.    1) Please take Tylenol 650 mg every 4 to 6 hours by mouth for moderate pain control. Please do not exceed over 4,000 mg of Tylenol a day.  2) Please start taking Eliquis 2.5 mg by mouth twice a day starting 3 days after surgery .  3) Please take Omeprazole 40 mg once a day by mouth.

## 2021-08-18 NOTE — DISCHARGE NOTE PROVIDER - NSDCCPCAREPLAN_GEN_ALL_CORE_FT
PRINCIPAL DISCHARGE DIAGNOSIS  Diagnosis: Obesity, morbid, BMI 40.0-49.9  Assessment and Plan of Treatment: 40F PMHx ovarian cystectomy,  x 2, MO (BMI 42.2) resistant to dietary and lifestyle interventions now s/p laparoscopic sleeve gastrectomy  post-op course notable for asymptomatic anemia with normal orthostatics and appropriate urine output.  Patient tolerated regular diet, pain well controlled and ambulating at baseline.  Follow up with Dr. Maciel in 1 week. Call the office at  to schedule your appointment.   You may shower; soap and water over incision sites. Do not scrub. Pat dry when done. No tub bathing or swimming until cleared. Keep incision sites out of the sun as scars will darken.   No heavy lifting (>10lbs) or strenuous exercise.   Diet: Bariatric Full Fluids. 60 grams protein daily.  64 fluid ounces water daily. Drink small sips throughout the day. Continue diet as outlined by paperwork received as a pre-operative patient.   You should be urinating at least 3-4x per day.   Call the office if you experience increasing abdominal pain, nausea, vomiting, or temperature >100.4F.    NO ASPIRIN OR NSAIDs until approved by Dr. Maciel. Avoid alcoholic beverages until cleared by Dr. Maciel.  1) Please take Tylenol 650 mg every 4 to 6 hours by mouth for moderate pain control. Please do not exceed over 4,000 mg of Tylenol a day.  2) Please start taking Eliquis 2.5 mg by mouth twice a day starting 3 days after surgery .  3) Please take Omeprazole 40 mg once a day by mouth.

## 2021-08-19 ENCOUNTER — TRANSCRIPTION ENCOUNTER (OUTPATIENT)
Age: 41
End: 2021-08-19

## 2021-08-19 VITALS
TEMPERATURE: 98 F | HEART RATE: 101 BPM | RESPIRATION RATE: 19 BRPM | SYSTOLIC BLOOD PRESSURE: 118 MMHG | OXYGEN SATURATION: 94 % | DIASTOLIC BLOOD PRESSURE: 76 MMHG

## 2021-08-19 LAB
ANION GAP SERPL CALC-SCNC: 9 MMOL/L — SIGNIFICANT CHANGE UP (ref 5–17)
BUN SERPL-MCNC: 7 MG/DL — SIGNIFICANT CHANGE UP (ref 7–23)
CALCIUM SERPL-MCNC: 8.5 MG/DL — SIGNIFICANT CHANGE UP (ref 8.4–10.5)
CHLORIDE SERPL-SCNC: 104 MMOL/L — SIGNIFICANT CHANGE UP (ref 96–108)
CO2 SERPL-SCNC: 26 MMOL/L — SIGNIFICANT CHANGE UP (ref 22–31)
CREAT SERPL-MCNC: 0.48 MG/DL — LOW (ref 0.5–1.3)
GLUCOSE SERPL-MCNC: 84 MG/DL — SIGNIFICANT CHANGE UP (ref 70–99)
HCT VFR BLD CALC: 21.7 % — LOW (ref 34.5–45)
HGB BLD-MCNC: 6.7 G/DL — CRITICAL LOW (ref 11.5–15.5)
MAGNESIUM SERPL-MCNC: 2 MG/DL — SIGNIFICANT CHANGE UP (ref 1.6–2.6)
MCHC RBC-ENTMCNC: 29.6 PG — SIGNIFICANT CHANGE UP (ref 27–34)
MCHC RBC-ENTMCNC: 30.9 GM/DL — LOW (ref 32–36)
MCV RBC AUTO: 96 FL — SIGNIFICANT CHANGE UP (ref 80–100)
NRBC # BLD: 0 /100 WBCS — SIGNIFICANT CHANGE UP (ref 0–0)
PHOSPHATE SERPL-MCNC: 3.8 MG/DL — SIGNIFICANT CHANGE UP (ref 2.5–4.5)
PLATELET # BLD AUTO: 185 K/UL — SIGNIFICANT CHANGE UP (ref 150–400)
POTASSIUM SERPL-MCNC: 3.6 MMOL/L — SIGNIFICANT CHANGE UP (ref 3.5–5.3)
POTASSIUM SERPL-SCNC: 3.6 MMOL/L — SIGNIFICANT CHANGE UP (ref 3.5–5.3)
RBC # BLD: 2.26 M/UL — LOW (ref 3.8–5.2)
RBC # FLD: 14.2 % — SIGNIFICANT CHANGE UP (ref 10.3–14.5)
SODIUM SERPL-SCNC: 139 MMOL/L — SIGNIFICANT CHANGE UP (ref 135–145)
WBC # BLD: 9.59 K/UL — SIGNIFICANT CHANGE UP (ref 3.8–10.5)
WBC # FLD AUTO: 9.59 K/UL — SIGNIFICANT CHANGE UP (ref 3.8–10.5)

## 2021-08-19 PROCEDURE — 86901 BLOOD TYPING SEROLOGIC RH(D): CPT

## 2021-08-19 PROCEDURE — 86900 BLOOD TYPING SEROLOGIC ABO: CPT

## 2021-08-19 PROCEDURE — 84100 ASSAY OF PHOSPHORUS: CPT

## 2021-08-19 PROCEDURE — C1889: CPT

## 2021-08-19 PROCEDURE — 88307 TISSUE EXAM BY PATHOLOGIST: CPT

## 2021-08-19 PROCEDURE — 97161 PT EVAL LOW COMPLEX 20 MIN: CPT

## 2021-08-19 PROCEDURE — 80048 BASIC METABOLIC PNL TOTAL CA: CPT

## 2021-08-19 PROCEDURE — 85027 COMPLETE CBC AUTOMATED: CPT

## 2021-08-19 PROCEDURE — 83735 ASSAY OF MAGNESIUM: CPT

## 2021-08-19 PROCEDURE — 86850 RBC ANTIBODY SCREEN: CPT

## 2021-08-19 PROCEDURE — 36415 COLL VENOUS BLD VENIPUNCTURE: CPT

## 2021-08-19 PROCEDURE — 86769 SARS-COV-2 COVID-19 ANTIBODY: CPT

## 2021-08-19 RX ORDER — POTASSIUM CHLORIDE 20 MEQ
40 PACKET (EA) ORAL ONCE
Refills: 0 | Status: COMPLETED | OUTPATIENT
Start: 2021-08-19 | End: 2021-08-19

## 2021-08-19 RX ORDER — IBUPROFEN 200 MG
400 TABLET ORAL ONCE
Refills: 0 | Status: COMPLETED | OUTPATIENT
Start: 2021-08-19 | End: 2021-08-19

## 2021-08-19 RX ADMIN — SCOPALAMINE 1 PATCH: 1 PATCH, EXTENDED RELEASE TRANSDERMAL at 06:16

## 2021-08-19 RX ADMIN — PANTOPRAZOLE SODIUM 40 MILLIGRAM(S): 20 TABLET, DELAYED RELEASE ORAL at 11:50

## 2021-08-19 RX ADMIN — Medication 650 MILLIGRAM(S): at 01:14

## 2021-08-19 RX ADMIN — Medication 40 MILLIEQUIVALENT(S): at 11:50

## 2021-08-19 RX ADMIN — SCOPALAMINE 1 PATCH: 1 PATCH, EXTENDED RELEASE TRANSDERMAL at 07:42

## 2021-08-19 RX ADMIN — Medication 650 MILLIGRAM(S): at 09:45

## 2021-08-19 RX ADMIN — Medication 650 MILLIGRAM(S): at 09:02

## 2021-08-19 RX ADMIN — Medication 650 MILLIGRAM(S): at 02:14

## 2021-08-19 RX ADMIN — Medication 400 MILLIGRAM(S): at 11:50

## 2021-08-19 NOTE — DISCHARGE NOTE NURSING/CASE MANAGEMENT/SOCIAL WORK - NSDCPEFALRISK_GEN_ALL_CORE
For information on Fall & injury Prevention, visit [email or web address]

## 2021-08-19 NOTE — DISCHARGE NOTE NURSING/CASE MANAGEMENT/SOCIAL WORK - PATIENT PORTAL LINK FT
You can access the FollowMyHealth Patient Portal offered by [facility] by registering at the following website: [email or web address]. By joining [email or web address]’s FollowMyHealth portal, you will also be able to view your health information using other applications (apps) compatible with our system.

## 2021-08-19 NOTE — PROGRESS NOTE ADULT - SUBJECTIVE AND OBJECTIVE BOX
INTERVAL HPI/OVERNIGHT EVENTS: perez some clears with -n/-v, good urine o/p, decreased IVF 75cc/hr, VSS    STATUS POST:  1    POST OPERATIVE DAY #: Lap sleeve gastrectomy and hiatal repair    SUBJECTIVE: Pt seen and examined at bedside this am by surgery team. Tolerating diet, pain well controlled. Denies f/n/v/sob. Patient denies HA, dizziness, lightheadedness, CP, palpitations, dry mouth or dry skin and has been urinating many times overnight without complaints.    MEDICATIONS  (STANDING):  pantoprazole  Injectable 40 milliGRAM(s) IV Push daily    MEDICATIONS  (PRN):  acetaminophen    Suspension .. 650 milliGRAM(s) Oral every 6 hours PRN Mild Pain (1 - 3)  acetaminophen  IVPB .. 1000 milliGRAM(s) IV Intermittent once PRN Mild Pain (1 - 3), Moderate Pain (4 - 6), Severe Pain (7 - 10)  ondansetron Injectable 4 milliGRAM(s) IV Push every 6 hours PRN Nausea      Vital Signs Last 24 Hrs  T(C): 37.1 (18 Aug 2021 08:10), Max: 37.2 (17 Aug 2021 16:46)  T(F): 98.7 (18 Aug 2021 08:10), Max: 98.9 (17 Aug 2021 16:46)  HR: 98 (18 Aug 2021 08:10) (93 - 120)  BP: 135/64 (18 Aug 2021 08:10) (107/52 - 158/65)  BP(mean): 75 (18 Aug 2021 05:51) (75 - 95)  RR: 16 (18 Aug 2021 08:10) (16 - 23)  SpO2: 95% (18 Aug 2021 08:10) (92% - 99%)    PHYSICAL EXAM:  Constitutional: A&Ox3  Respiratory: non labored breathing, no respiratory distress, on room air  Cardiovascular: NSR, RRR, no orthostatic hypotension (BP stable with changes from supine to standing), minimally tachycardic with ambulating, no CP nor palpitaitons  Gastrointestinal: Soft, minimally TTP around incision sites, no rebound, no guarding, incision sites with dermabond in place no surround erythema, induration, fluctuance nor drainage  Extremities: (-) edema, calves NTTP, SCDs in place, WWP, brisk cap refill      I&O's Detail    17 Aug 2021 07:01  -  18 Aug 2021 07:00  --------------------------------------------------------  IN:    IV PiggyBack: 100 mL    Lactated Ringers: 2700 mL  Total IN: 2800 mL    OUT:    Indwelling Catheter - Urethral (mL): 225 mL    Voided (mL): 4350 mL  Total OUT: 4575 mL    Total NET: -1775 mL      18 Aug 2021 07:01  -  18 Aug 2021 09:49  --------------------------------------------------------  IN:    Lactated Ringers: 75 mL  Total IN: 75 mL    OUT:  Total OUT: 0 mL    Total NET: 75 mL      LABS:                        6.6    11.50 )-----------( 177      ( 18 Aug 2021 06:52 )             20.8     08-18    140  |  105  |  7   ----------------------------<  110<H>  3.9   |  28  |  0.52    Ca    8.7      18 Aug 2021 05:23  Phos  2.7     08-18  Mg     1.9     08-18
seen on rounds with team  she looks fine   terrific urine output and no dizziness  had her ambulate without assistance and walk and moves briskly  will get repeat orthostatics pm today and if is fine can be dced  will hold eliquis  
GENERAL SURGERY PROGRESS NOTE    SUBJECTIVE: Patient seen and examined bedside in morning with chief resident, patient had no issues overnight. Tolerating 4 oz an hour, complains of mild headache. No nausea/vomiting. Is having flatus and BM.        Vital Signs Last 24 Hrs  T(C): 36.6 (19 Aug 2021 05:39), Max: 37.3 (18 Aug 2021 13:37)  T(F): 97.8 (19 Aug 2021 05:39), Max: 99.1 (18 Aug 2021 13:37)  HR: 72 (19 Aug 2021 05:39) (72 - 100)  BP: 113/71 (19 Aug 2021 05:39) (110/55 - 135/64)  BP(mean): --  RR: 18 (19 Aug 2021 05:39) (16 - 20)  SpO2: 99% (19 Aug 2021 05:39) (93% - 99%)  I&O's Detail    17 Aug 2021 07:01  -  18 Aug 2021 07:00  --------------------------------------------------------  IN:    IV PiggyBack: 100 mL    Lactated Ringers: 2700 mL  Total IN: 2800 mL    OUT:    Indwelling Catheter - Urethral (mL): 225 mL    Voided (mL): 4350 mL  Total OUT: 4575 mL    Total NET: -1775 mL      18 Aug 2021 07:01  -  19 Aug 2021 06:53  --------------------------------------------------------  IN:    IV PiggyBack: 100 mL    Lactated Ringers: 75 mL  Total IN: 175 mL    OUT:    Voided (mL): 3400 mL  Total OUT: 3400 mL    Total NET: -3225 mL          PHYSICAL EXAM    General: NAD, resting comfortably in bed  C/V: NSR  Pulm: Nonlabored breathing, no respiratory distress on room air  Abd: soft, NT/ND. incisions clean/dry/intact.  Extrem: WWP, no edema, SCDs in place        LABS:                        6.6    11.50 )-----------( 177      ( 18 Aug 2021 06:52 )             20.8     08-18    140  |  105  |  7   ----------------------------<  110<H>  3.9   |  28  |  0.52    Ca    8.7      18 Aug 2021 05:23  Phos  2.7     08-18  Mg     1.9     08-18            RADIOLOGY & ADDITIONAL STUDIES:  
GENERAL SURGERY PROGRESS NOTE    SUBJECTIVE: Patient seen and examined bedside in morning with chief resident. Patient yesterday had hypotensive episodes and was bolused 2L, today her vitals are more stable. Hgb stable as well. Complains of abdominal pain, no n/v, +flatus. Tolerating diet.        Vital Signs Last 24 Hrs  T(C): 36.8 (17 Aug 2021 05:39), Max: 37.3 (17 Aug 2021 03:39)  T(F): 98.3 (17 Aug 2021 05:39), Max: 99.1 (17 Aug 2021 03:39)  HR: 92 (17 Aug 2021 06:31) (60 - 104)  BP: 132/65 (17 Aug 2021 06:31) (79/53 - 190/82)  BP(mean): 98 (16 Aug 2021 18:33) (60 - 124)  RR: 20 (17 Aug 2021 06:31) (12 - 24)  SpO2: 93% (17 Aug 2021 06:31) (92% - 100%)  I&O's Detail    16 Aug 2021 07:01  -  17 Aug 2021 07:00  --------------------------------------------------------  IN:    IV PiggyBack: 200 mL    Lactated Ringers: 1890 mL    Lactated Ringers Bolus: 1000 mL    Lactated Ringers Bolus: 1000 mL    Oral Fluid: 120 mL  Total IN: 4210 mL    OUT:    Indwelling Catheter - Urethral (mL): 1525 mL  Total OUT: 1525 mL    Total NET: 2685 mL          PHYSICAL EXAM    General: NAD, resting comfortably in bed  C/V: NSR  Pulm: Nonlabored breathing, no respiratory distress on room air  Abd: soft, mild epigastric tenderness/incisional tenderness, no guarding, no rebound tenderness. Incisions clean/dry/intact  Extrem: WWP, no edema, SCDs in place        LABS:                        8.7    13.78 )-----------( 201      ( 17 Aug 2021 00:26 )             27.5                 RADIOLOGY & ADDITIONAL STUDIES:  
POST-OPERATIVE NOTE    Procedure: Lap sleeve gastrectomy and hiatal repair    Diagnosis/Indication: Morbid obesity    Surgeon: Dr. Maciel    S: Patient has some abdominal pain, slight sleepiness. Denies CP, SOB, MAHONEY, calf tenderness. Pain controlled with medication. Denies nausea, vomiting. In PACU went down to 80s SBP post 0.25 Dilaudid IV push, given 2 boluses. improved to 93/57 HR 64.     O:  T(C): --  T(F): --  HR: 64 (08-16-21 @ 17:03) (62 - 78)  BP: 93/57 (08-16-21 @ 17:03) (83/52 - 190/82)  RR: 13 (08-16-21 @ 17:03) (12 - 17)  SpO2: 100% (08-16-21 @ 17:03) (95% - 100%)  Wt(kg): --                        9.9    18.43 )-----------( 220      ( 16 Aug 2021 16:52 )             32.3             PHYSICAL EXAM    Gen: NAD, resting comfortably in bed  C/V: NSR  Pulm: Nonlabored breathing, no respiratory distress w/ NC  Abd: soft, moderately distended, tender to palpation epigastric region, incisions clean/dry/intact  Extrem: WWP, no calf edema or tenderness, SCDs in place

## 2021-08-19 NOTE — PROGRESS NOTE ADULT - ASSESSMENT
40F PMHx ovarian cystectomy,  x 2, MO (BMI 42.2) resistant to dietary and lifestyle interventions now s/p laparoscopic sleeve gastrectomy .    Pain/nausea control  BCLD/IVF LR @ 140cc/hr  Protonix  D/c Ramirez with TOV post 8 hrs  SCDs, OOBA, IS,  Nutritional consult in AM
40F PMHx ovarian cystectomy,  x 2, MO (BMI 42.2) resistant to dietary and lifestyle interventions now s/p laparoscopic sleeve gastrectomy  with asymptomatic post-op anemia likely anemia of chronic disease    Pain/nausea control  IVL  Monitor vitals for orthostatic hypotension  Protonix  Passed TOBV  SCDs, OALIDA, IS,  Nutritional consult in AM  DIspo: Home possibly today or tomorrow
40F PMHx ovarian cystectomy,  x 2, MO (BMI 42.2) resistant to dietary and lifestyle interventions now s/p laparoscopic sleeve gastrectomy .    Pain/nausea control  BCLD  Protonix  Eliquis POD 3 x 30D  SCDs, OOBA, IS,  Dispo pending oral tolerance, stable H/H.  
A/P: 40y Female s/p above procedure  Diet: BCLD  IVF: LR  Pain/nausea control  SQH/SCDs/OOBA/IS  Dispo: Telemetry

## 2021-08-20 ENCOUNTER — NON-APPOINTMENT (OUTPATIENT)
Age: 41
End: 2021-08-20

## 2021-08-23 PROBLEM — Z78.9 OTHER SPECIFIED HEALTH STATUS: Chronic | Status: ACTIVE | Noted: 2021-08-16

## 2021-08-24 DIAGNOSIS — K76.0 FATTY (CHANGE OF) LIVER, NOT ELSEWHERE CLASSIFIED: ICD-10-CM

## 2021-08-24 DIAGNOSIS — E66.9 OBESITY, UNSPECIFIED: ICD-10-CM

## 2021-08-24 DIAGNOSIS — E66.01 MORBID (SEVERE) OBESITY DUE TO EXCESS CALORIES: ICD-10-CM

## 2021-08-24 DIAGNOSIS — K21.00 GASTRO-ESOPHAGEAL REFLUX DISEASE WITH ESOPHAGITIS, WITHOUT BLEEDING: ICD-10-CM

## 2021-08-24 DIAGNOSIS — Z87.891 PERSONAL HISTORY OF NICOTINE DEPENDENCE: ICD-10-CM

## 2021-08-24 DIAGNOSIS — K44.9 DIAPHRAGMATIC HERNIA WITHOUT OBSTRUCTION OR GANGRENE: ICD-10-CM

## 2021-08-24 DIAGNOSIS — I95.9 HYPOTENSION, UNSPECIFIED: ICD-10-CM

## 2021-08-24 DIAGNOSIS — K29.50 UNSPECIFIED CHRONIC GASTRITIS WITHOUT BLEEDING: ICD-10-CM

## 2021-08-25 ENCOUNTER — APPOINTMENT (OUTPATIENT)
Dept: BARIATRICS | Facility: CLINIC | Age: 41
End: 2021-08-25
Payer: COMMERCIAL

## 2021-08-25 VITALS — BODY MASS INDEX: 36.99 KG/M2 | WEIGHT: 201 LBS | HEIGHT: 62 IN

## 2021-08-25 LAB — SURGICAL PATHOLOGY STUDY: SIGNIFICANT CHANGE UP

## 2021-08-25 PROCEDURE — 99024 POSTOP FOLLOW-UP VISIT: CPT

## 2021-08-25 NOTE — HISTORY OF PRESENT ILLNESS
[Home] : at home, [unfilled] , at the time of the visit. [Medical Office: ([facility])___] : at the medical office located in  [Verbal consent obtained from patient] : the patient, [unfilled] [de-identified] : Ms. Amaya is following up today for a postoperative visit 9 days s/p sleeve gastrectomy on 8/16. She reports she is doing well, denies pain, n/v, fevers, calf pain, dizziness or PO intolerance. Tolerating stage II diet well, having about 60g of protein daily and states she is drinking adequate amounts of fluid. Compliant with vitamins, taking a multivitamin, calcium citrate and vitamin B12. Also taking omeprazole and eliquis daily. Reports having regular bowel movements daily. Incisions are healing well with no signs of infection.

## 2021-08-25 NOTE — PLAN
[FreeTextEntry1] : Follow up with dietitian to advance diet as per guidelines\par Ensure meeting fluid goals of 64oz daily\par Follow up in 4-6 weeks

## 2021-08-31 ENCOUNTER — NON-APPOINTMENT (OUTPATIENT)
Age: 41
End: 2021-08-31

## 2021-11-03 ENCOUNTER — APPOINTMENT (OUTPATIENT)
Dept: BARIATRICS | Facility: CLINIC | Age: 41
End: 2021-11-03
Payer: COMMERCIAL

## 2021-11-03 VITALS — BODY MASS INDEX: 32.57 KG/M2 | WEIGHT: 177 LBS | HEIGHT: 62 IN

## 2021-11-03 DIAGNOSIS — Z98.84 BARIATRIC SURGERY STATUS: ICD-10-CM

## 2021-11-03 DIAGNOSIS — E66.01 MORBID (SEVERE) OBESITY DUE TO EXCESS CALORIES: ICD-10-CM

## 2021-11-03 PROCEDURE — 99024 POSTOP FOLLOW-UP VISIT: CPT

## 2021-11-08 PROBLEM — Z98.84 S/P LAPAROSCOPIC SLEEVE GASTRECTOMY: Status: ACTIVE | Noted: 2021-08-25

## 2021-11-08 PROBLEM — E66.01 MORBID OBESITY: Status: ACTIVE | Noted: 2021-03-16

## 2021-11-08 NOTE — PLAN
[FreeTextEntry1] : Declined to f/u with nutrition at this time\par Discussed importance of increasing activity including strength training to optimize weight loss\par Follow up in 3 months

## 2021-11-08 NOTE — ASSESSMENT
[FreeTextEntry1] : 41 year old female having a normal postoperative course 2.5 months s/p sleeve gastrectomy. Tolerating a well balanced diet well with adequate protein intake and compliant with vitamin supplementation.

## 2021-11-08 NOTE — HISTORY OF PRESENT ILLNESS
[Home] : at home, [unfilled] , at the time of the visit. [Medical Office: ([facility])___] : at the medical office located in  [Verbal consent obtained from patient] : the patient, [unfilled] [de-identified] : Ms. Amaya presents today for a follow up visit 2.5 months s/p sleeve gastrectomy on 8/16. Pt reports she is doing well with no complains, she is down almost 50bs so far. Tolerating a regular diet well with adequate protein intake. Compliant with all vitamin supplementation and denies any difficulties with bowel movements. She is walking for exercise.

## 2021-11-10 ENCOUNTER — NON-APPOINTMENT (OUTPATIENT)
Age: 41
End: 2021-11-10

## 2021-11-11 ENCOUNTER — NON-APPOINTMENT (OUTPATIENT)
Age: 41
End: 2021-11-11

## 2022-03-03 ENCOUNTER — RESULT REVIEW (OUTPATIENT)
Age: 42
End: 2022-03-03

## 2023-01-30 ENCOUNTER — NON-APPOINTMENT (OUTPATIENT)
Age: 43
End: 2023-01-30

## 2024-11-15 NOTE — ASSESSMENT
S/W pt & she wanted to be seen today bc her Gyn said that she has blood in her rectum & stool. PT said the only Sx she's having is pressure on her bottom. PT requested that a messaged be sent to provider. Pt was informed that [facility] is not currently taking pts having GI issues at this time. Staff advised pt to call PCP for referral to another GI provider with sooner availability. -TUNG   [FreeTextEntry1] : 40 year old female having a normal postoperative course 9 days s/p sleeve gastrectomy. Tolerating stage II diet well, meeting protein and fluid requirements. Compliant with vitamins and medications.